# Patient Record
Sex: MALE | Race: WHITE | Employment: FULL TIME | URBAN - METROPOLITAN AREA
[De-identification: names, ages, dates, MRNs, and addresses within clinical notes are randomized per-mention and may not be internally consistent; named-entity substitution may affect disease eponyms.]

---

## 2024-07-10 ENCOUNTER — HOSPITAL ENCOUNTER (EMERGENCY)
Age: 19
Discharge: HOME | End: 2024-07-10
Attending: EMERGENCY MEDICINE
Payer: COMMERCIAL

## 2024-07-10 VITALS
WEIGHT: 186.95 LBS | SYSTOLIC BLOOD PRESSURE: 140 MMHG | HEIGHT: 75 IN | OXYGEN SATURATION: 100 % | BODY MASS INDEX: 23.24 KG/M2 | TEMPERATURE: 97.7 F | DIASTOLIC BLOOD PRESSURE: 67 MMHG | RESPIRATION RATE: 16 BRPM | HEART RATE: 58 BPM

## 2024-07-10 DIAGNOSIS — T63.441A LOCAL REACTION TO BEE STING, ACCIDENTAL OR UNINTENTIONAL, INITIAL ENCOUNTER: Primary | ICD-10-CM

## 2024-07-10 PROCEDURE — 99283 EMERGENCY DEPT VISIT LOW MDM: CPT

## 2024-07-10 PROCEDURE — 6370000000 HC RX 637 (ALT 250 FOR IP): Performed by: EMERGENCY MEDICINE

## 2024-07-10 RX ORDER — PREDNISONE 20 MG/1
60 TABLET ORAL DAILY
Qty: 9 TABLET | Refills: 0 | Status: SHIPPED | OUTPATIENT
Start: 2024-07-10 | End: 2024-07-13

## 2024-07-10 RX ORDER — AMOXICILLIN AND CLAVULANATE POTASSIUM 875; 125 MG/1; MG/1
1 TABLET, FILM COATED ORAL ONCE
Status: COMPLETED | OUTPATIENT
Start: 2024-07-10 | End: 2024-07-10

## 2024-07-10 RX ORDER — PREDNISONE 20 MG/1
60 TABLET ORAL ONCE
Status: COMPLETED | OUTPATIENT
Start: 2024-07-10 | End: 2024-07-10

## 2024-07-10 RX ORDER — DIPHENHYDRAMINE HCL 25 MG
25 TABLET ORAL ONCE
Status: COMPLETED | OUTPATIENT
Start: 2024-07-10 | End: 2024-07-10

## 2024-07-10 RX ORDER — AMOXICILLIN AND CLAVULANATE POTASSIUM 875; 125 MG/1; MG/1
1 TABLET, FILM COATED ORAL 2 TIMES DAILY
Qty: 14 TABLET | Refills: 0 | Status: SHIPPED | OUTPATIENT
Start: 2024-07-10 | End: 2024-07-17

## 2024-07-10 RX ADMIN — PREDNISONE 60 MG: 20 TABLET ORAL at 20:33

## 2024-07-10 RX ADMIN — AMOXICILLIN AND CLAVULANATE POTASSIUM 1 TABLET: 875; 125 TABLET, FILM COATED ORAL at 20:33

## 2024-07-10 RX ADMIN — DIPHENHYDRAMINE HYDROCHLORIDE 25 MG: 25 TABLET ORAL at 20:33

## 2024-07-10 ASSESSMENT — VISUAL ACUITY
OS: 20/13
OU: 20/10
OD: 20/20

## 2024-07-11 NOTE — ED PROVIDER NOTES
[facility] EMERGENCY DEPT VISIT      Patient Identification  Dilcia Murcia is a 18 y.o. male.    Chief Complaint   Insect Bite (Right cheek)      History of Present Illness:    History was obtained from patient.  Limitations to history:none  This is a  18 y.o. male who presents ambulatory  to the ED with complaints of redness and swelling to the right periorbital region and cheek.  Patient states that he was stung by a hornet or a bee or wasp yesterday.  It has been itching at the site since but the swelling did not start until today.  He denies any fever.  No deep eye pain or decreased vision.  No pain with eye movement.  He denies any hives.  No throat swelling or tightness.  No chest tightness or shortness of breath.  Patient has taken no medications for his symptoms..     History reviewed. No pertinent past medical history.    History reviewed. No pertinent surgical history.    No current facility-administered medications for this encounter.    Current Outpatient Medications:     predniSONE (DELTASONE) 20 MG tablet, Take 3 tablets by mouth daily for 3 days, Disp: 9 tablet, Rfl: 0    amoxicillin-clavulanate (AUGMENTIN) 875-125 MG per tablet, Take 1 tablet by mouth 2 times daily for 7 days, Disp: 14 tablet, Rfl: 0    No Known Allergies    Social History     Socioeconomic History    Marital status: Single     Spouse name: Not on file    Number of children: Not on file    Years of education: Not on file    Highest education level: Not on file   Occupational History    Not on file   Tobacco Use    Smoking status: Never     Passive exposure: Never    Smokeless tobacco: Never   Substance and Sexual Activity    Alcohol use: Never    Drug use: Never    Sexual activity: Not on file   Other Topics Concern    Not on file   Social History Narrative    Not on file     Social Determinants of Health     Financial Resource Strain: Not on file   Food Insecurity: Not on file   Transportation Needs: Not on file   Physical Activity:

## 2024-07-11 NOTE — DISCHARGE INSTRUCTIONS
Ice to face. Elevate head when you sleep for next 2 days. Benadryl every 6 hours. The redness and swelling is most likely a localized allergic reaction to the sting. If persistent or not improving with benadryl and steroids over next 24 -48 hours, there would be more concern for cellulitis (infection). Return for fever, increased redness, pain with eye movement, blistering, severe headache.